# Patient Record
Sex: FEMALE | Race: WHITE
[De-identification: names, ages, dates, MRNs, and addresses within clinical notes are randomized per-mention and may not be internally consistent; named-entity substitution may affect disease eponyms.]

---

## 2023-09-19 ENCOUNTER — HOSPITAL ENCOUNTER (OUTPATIENT)
Dept: HOSPITAL 47 - RADXRMAIN | Age: 43
Discharge: HOME | End: 2023-09-19
Attending: EMERGENCY MEDICINE
Payer: COMMERCIAL

## 2023-09-19 DIAGNOSIS — M79.641: ICD-10-CM

## 2023-09-19 DIAGNOSIS — S53.401A: ICD-10-CM

## 2023-09-19 DIAGNOSIS — S63.501A: Primary | ICD-10-CM

## 2023-09-19 DIAGNOSIS — M79.631: ICD-10-CM
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
